# Patient Record
Sex: MALE | Race: OTHER | Employment: STUDENT | URBAN - METROPOLITAN AREA
[De-identification: names, ages, dates, MRNs, and addresses within clinical notes are randomized per-mention and may not be internally consistent; named-entity substitution may affect disease eponyms.]

---
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
## 2023-12-28 ENCOUNTER — HOSPITAL ENCOUNTER (OUTPATIENT)
Dept: GENERAL RADIOLOGY | Facility: HOSPITAL | Age: 18
Discharge: HOME | End: 2023-12-28
Attending: ORTHOPAEDIC SURGERY
Payer: COMMERCIAL

## 2023-12-28 ENCOUNTER — OFFICE VISIT (OUTPATIENT)
Dept: ORTHOPEDICS CLINIC | Facility: CLINIC | Age: 18
End: 2023-12-28

## 2023-12-28 DIAGNOSIS — S63.592A COMPLEX TEAR OF TRIANGULAR FIBROCARTILAGE OF LEFT WRIST, INITIAL ENCOUNTER: Primary | ICD-10-CM

## 2023-12-28 DIAGNOSIS — M25.532 LEFT WRIST PAIN: ICD-10-CM

## 2023-12-28 PROCEDURE — 73110 X-RAY EXAM OF WRIST: CPT | Performed by: ORTHOPAEDIC SURGERY

## 2023-12-28 PROCEDURE — 99244 OFF/OP CNSLTJ NEW/EST MOD 40: CPT | Performed by: ORTHOPAEDIC SURGERY

## 2023-12-28 NOTE — PROGRESS NOTES
NURSING INTAKE COMMENTS:   Chief Complaint   Patient presents with    Wrist Pain     Consult - L wrist - Pt states he injured wrist while boxing about 3 months ago -  some cracking with movent - no numbness or tingling       HPI: This 19year old male presents today with complaints of left wrist pain. He is had pain along the ulnar border of his left wrist intermittently over the last year. He injured his left wrist while boxing. He forcefully palmar flex the wrist at impact and felt immediate pain along the ulnar aspect of the wrist.  Symptoms slightly improved but never completely resolved. About 2 months ago he had another injury when boxing whereby he again palmar flex the wrist at impact. He goes to school at [employer] in [place]. He saw physician in [place] who suggested he had a TFC injury. He was given a splint that he wore short-term. Is not wearing any type of support on the wrist currently. Does feel some swelling along the ulnar wrist.  No numbness or tingling. Does have clicking in the wrist with rotation    History reviewed. No pertinent past medical history. History reviewed. No pertinent surgical history. No current outpatient medications on file.      No Known Allergies  Family History   Problem Relation Age of Onset    Cancer Other         pancreatic, family h/o    Diabetes Other         family h/o    Lipids Other         hyperlipidemia, family h/o    Hypertension Other         family h/o    Obesity Other         family h/o    Allergies Other         family h/o    Breast Cancer Other         family h/o       Social History     Occupational History    Not on file   Tobacco Use    Smoking status: Never    Smokeless tobacco: Never   Substance and Sexual Activity    Alcohol use: Not on file    Drug use: Not on file    Sexual activity: Not on file        Review of Systems:  GENERAL: denies fevers, chills, night sweats, fatigue, unintentional weight loss/gain  SKIN: denies skin lesions, open sores, rash  HEENT:denies recent vision change, new nasal congestion,hearing loss, tinnitus, sore throat, headaches  RESPIRATORY: denies new shortness of breath, cough, asthma, wheezing  CARDIOVASCULAR: denies chest pain, leg cramps with exertion, palpitations, leg swelling  GI: denies abdominal pain, nausea, vomiting, diarrhea, constipation, hematochezia, worsening heartburn or stomach ulcers  : denies dysuria, hematuria, incontinence, increased frequency, urgency, difficulty urinating  MUSCULOSKELETAL: denies musculoskeletal complaints other than in HPI  NEURO: denies numbness, tingling, weakness, balance issues, dizziness, memory loss  PSYCHIATRIC: denies Hx of depression, anxiety, other psychiatric disorders  HEMATOLOGIC: denies blood clots, anemia, blood clotting disorders, blood transfusion  ENDOCRINE: denies autoimmune disease, thyroid issues, or diabetes  ALLERGY: denies asthma, seasonal allergies    Physical Examination:    There were no vitals taken for this visit. Constitutional: appears well hydrated, alert and responsive, no acute distress noted  Extremities: Left wrist tender over the ulnar aspect particular over the ulnar styloid but also over the TFC. Ulnar deviation of the wrist produces mild pain. No significant pain with pronation supination of the forearm. There is a palpable click with pronation supination which seems to emanate from the distal radial ulnar joint. Distal radial ulnar joint shuck testing produces minimal pain and mild instability. Radial aspect of the wrist completely nontender. Snuffbox nontender. Neurological: Left hand light touch and pinprick sensory exam normal. Lateral shoulder light touch and pinprick sensory examination normal.  strength,wrist extension, biceps, triceps, and shoulder abduction strength 5 out of 5. Aleksandr sign negative. No obvious atrophy of the hand. Imaging:   X-rays left wrist are unremarkable.     Labs:  Lab Results   Component Value Date    WBC 8.5 08/26/2018    HGB 16.5 08/26/2018     08/26/2018      Lab Results   Component Value Date    GLU 76 06/07/2019    BUN 17 06/07/2019    CREATSERUM 0.97 06/07/2019    GFRNAA 74 06/07/2019    GFRAA 74 06/07/2019        Assessment and Plan:  Diagnoses and all orders for this visit:    Complex tear of triangular fibrocartilage of left wrist, initial encounter  -     MRI WRIST, LEFT (CPT=73221); Future    Left wrist pain  -     XR WRIST COMPLETE (MIN 3 VIEWS), LEFT (CPT=73110); Future        Assessment: Left wrist ulnar-sided pain, findings consistent with TFC injury    Plan: Given the time course of his symptoms and the lack of improvement over the last year, I advised an MRI of the wrist to evaluate the integrity of the TFC. Follow-up again after the MRI is completed. He is [place] by his mother today. I advised against weightbearing type exercise and martial arts and boxing. Follow Up: Return for follow-up visit after ordered tests completed.     Doris Lombardi MD

## 2023-12-29 ENCOUNTER — LAB ENCOUNTER (OUTPATIENT)
Dept: LAB | Age: 18
End: 2023-12-29
Attending: FAMILY MEDICINE
Payer: COMMERCIAL

## 2023-12-29 ENCOUNTER — OFFICE VISIT (OUTPATIENT)
Dept: FAMILY MEDICINE CLINIC | Facility: CLINIC | Age: 18
End: 2023-12-29

## 2023-12-29 VITALS
HEIGHT: 69.29 IN | BODY MASS INDEX: 30.02 KG/M2 | RESPIRATION RATE: 16 BRPM | TEMPERATURE: 98 F | DIASTOLIC BLOOD PRESSURE: 73 MMHG | WEIGHT: 205 LBS | HEART RATE: 59 BPM | SYSTOLIC BLOOD PRESSURE: 110 MMHG

## 2023-12-29 DIAGNOSIS — Z00.00 ROUTINE PHYSICAL EXAMINATION: Primary | ICD-10-CM

## 2023-12-29 DIAGNOSIS — Z11.3 SCREEN FOR STD (SEXUALLY TRANSMITTED DISEASE): ICD-10-CM

## 2023-12-29 DIAGNOSIS — Z00.00 ROUTINE PHYSICAL EXAMINATION: ICD-10-CM

## 2023-12-29 DIAGNOSIS — R41.89 COGNITIVE CHANGE: ICD-10-CM

## 2023-12-29 LAB
ALBUMIN SERPL-MCNC: 4.5 G/DL (ref 3.2–4.8)
ALBUMIN/GLOB SERPL: 1.6 {RATIO} (ref 1–2)
ALP LIVER SERPL-CCNC: 80 U/L
ALT SERPL-CCNC: 15 U/L
ANION GAP SERPL CALC-SCNC: 3 MMOL/L (ref 0–18)
AST SERPL-CCNC: 21 U/L (ref ?–34)
BILIRUB SERPL-MCNC: 0.5 MG/DL (ref 0.3–1.2)
BUN BLD-MCNC: 13 MG/DL (ref 9–23)
BUN/CREAT SERPL: 13 (ref 10–20)
CALCIUM BLD-MCNC: 9.6 MG/DL (ref 8.7–10.4)
CHLORIDE SERPL-SCNC: 106 MMOL/L (ref 98–112)
CHOLEST SERPL-MCNC: 155 MG/DL (ref ?–200)
CO2 SERPL-SCNC: 29 MMOL/L (ref 21–32)
CREAT BLD-MCNC: 1 MG/DL
DEPRECATED RDW RBC AUTO: 43.5 FL (ref 35.1–46.3)
EGFRCR SERPLBLD CKD-EPI 2021: 110 ML/MIN/1.73M2 (ref 60–?)
ERYTHROCYTE [DISTWIDTH] IN BLOOD BY AUTOMATED COUNT: 12.6 % (ref 11–15)
FASTING PATIENT LIPID ANSWER: YES
FASTING STATUS PATIENT QL REPORTED: YES
GLOBULIN PLAS-MCNC: 2.9 G/DL (ref 2.8–4.4)
GLUCOSE BLD-MCNC: 85 MG/DL (ref 70–99)
HCT VFR BLD AUTO: 52.3 %
HCV AB SERPL QL IA: NONREACTIVE
HDLC SERPL-MCNC: 57 MG/DL (ref 40–59)
HGB BLD-MCNC: 16.7 G/DL
LDLC SERPL CALC-MCNC: 84 MG/DL (ref ?–100)
MCH RBC QN AUTO: 29.6 PG (ref 26–34)
MCHC RBC AUTO-ENTMCNC: 31.9 G/DL (ref 31–37)
MCV RBC AUTO: 92.7 FL
NONHDLC SERPL-MCNC: 98 MG/DL (ref ?–130)
OSMOLALITY SERPL CALC.SUM OF ELEC: 285 MOSM/KG (ref 275–295)
PLATELET # BLD AUTO: 159 10(3)UL (ref 150–450)
POTASSIUM SERPL-SCNC: 4.3 MMOL/L (ref 3.5–5.1)
PROT SERPL-MCNC: 7.4 G/DL (ref 5.7–8.2)
RBC # BLD AUTO: 5.64 X10(6)UL
SODIUM SERPL-SCNC: 138 MMOL/L (ref 136–145)
T PALLIDUM AB SER QL IA: NONREACTIVE
TRIGL SERPL-MCNC: 75 MG/DL (ref 30–149)
TSI SER-ACNC: 3.43 MIU/ML (ref 0.55–4.78)
VLDLC SERPL CALC-MCNC: 12 MG/DL (ref 0–30)
WBC # BLD AUTO: 5.4 X10(3) UL (ref 4–11)

## 2023-12-29 PROCEDURE — 85027 COMPLETE CBC AUTOMATED: CPT

## 2023-12-29 PROCEDURE — 87491 CHLMYD TRACH DNA AMP PROBE: CPT

## 2023-12-29 PROCEDURE — 3074F SYST BP LT 130 MM HG: CPT | Performed by: FAMILY MEDICINE

## 2023-12-29 PROCEDURE — 86803 HEPATITIS C AB TEST: CPT

## 2023-12-29 PROCEDURE — 80053 COMPREHEN METABOLIC PANEL: CPT

## 2023-12-29 PROCEDURE — 99395 PREV VISIT EST AGE 18-39: CPT | Performed by: FAMILY MEDICINE

## 2023-12-29 PROCEDURE — 87389 HIV-1 AG W/HIV-1&-2 AB AG IA: CPT

## 2023-12-29 PROCEDURE — 84443 ASSAY THYROID STIM HORMONE: CPT

## 2023-12-29 PROCEDURE — 86780 TREPONEMA PALLIDUM: CPT

## 2023-12-29 PROCEDURE — 3008F BODY MASS INDEX DOCD: CPT | Performed by: FAMILY MEDICINE

## 2023-12-29 PROCEDURE — 3078F DIAST BP <80 MM HG: CPT | Performed by: FAMILY MEDICINE

## 2023-12-29 PROCEDURE — 36415 COLL VENOUS BLD VENIPUNCTURE: CPT

## 2023-12-29 PROCEDURE — 80061 LIPID PANEL: CPT

## 2023-12-29 PROCEDURE — 87591 N.GONORRHOEAE DNA AMP PROB: CPT
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
## 2024-01-01 LAB
C TRACH DNA SPEC QL NAA+PROBE: NEGATIVE
N GONORRHOEA DNA SPEC QL NAA+PROBE: NEGATIVE

## 2024-03-25 ENCOUNTER — HOSPITAL ENCOUNTER (OUTPATIENT)
Dept: MRI IMAGING | Facility: HOSPITAL | Age: 19
Discharge: HOME | End: 2024-03-25
Attending: ORTHOPAEDIC SURGERY
Payer: COMMERCIAL

## 2024-03-25 DIAGNOSIS — S63.592A COMPLEX TEAR OF TRIANGULAR FIBROCARTILAGE OF LEFT WRIST, INITIAL ENCOUNTER: ICD-10-CM

## 2024-03-25 PROCEDURE — 73221 MRI JOINT UPR EXTREM W/O DYE: CPT | Performed by: ORTHOPAEDIC SURGERY

## 2024-03-28 ENCOUNTER — OFFICE VISIT (OUTPATIENT)
Dept: ORTHOPEDICS CLINIC | Facility: CLINIC | Age: 19
End: 2024-03-28
Payer: COMMERCIAL

## 2024-03-28 DIAGNOSIS — S66.912D STRAIN OF LEFT WRIST, SUBSEQUENT ENCOUNTER: Primary | ICD-10-CM

## 2024-03-28 PROCEDURE — 99213 OFFICE O/P EST LOW 20 MIN: CPT | Performed by: ORTHOPAEDIC SURGERY

## 2024-03-28 NOTE — PROGRESS NOTES
NURSING INTAKE COMMENTS:   Chief Complaint   Patient presents with    Wrist Pain     L wrist f/u- Pt here for MRI results. Pt states no pain due to discontinuing  activity.        HPI: This 18 year old male presents today with complaints of left wrist pain follow-up.  He had the MRI and is here for the results.  Was last here a few months ago.  He has noted improvement in his symptoms.  He has been avoiding activities including sports and weightlifting.    History reviewed. No pertinent past medical history.  History reviewed. No pertinent surgical history.  No current outpatient medications on file.     No Known Allergies  Family History   Problem Relation Age of Onset    Cancer Other         pancreatic, family h/o    Diabetes Other         family h/o    Lipids Other         hyperlipidemia, family h/o    Hypertension Other         family h/o    Obesity Other         family h/o    Allergies Other         family h/o    Breast Cancer Other         family h/o       Social History     Occupational History    Not on file   Tobacco Use    Smoking status: Never     Passive exposure: Never    Smokeless tobacco: Never   Vaping Use    Vaping Use: Never used   Substance and Sexual Activity    Alcohol use: Not on file    Drug use: Yes     Types: Cannabis    Sexual activity: Not on file        Review of Systems:  GENERAL: denies fevers, chills, night sweats, fatigue, unintentional weight loss/gain  SKIN: denies skin lesions, open sores, rash  HEENT:denies recent vision change, new nasal congestion,hearing loss, tinnitus, sore throat, headaches  RESPIRATORY: denies new shortness of breath, cough, asthma, wheezing  CARDIOVASCULAR: denies chest pain, leg cramps with exertion, palpitations, leg swelling  GI: denies abdominal pain, nausea, vomiting, diarrhea, constipation, hematochezia, worsening heartburn or stomach ulcers  : denies dysuria, hematuria, incontinence, increased frequency, urgency, difficulty  urinating  MUSCULOSKELETAL: denies musculoskeletal complaints other than in HPI  NEURO: denies numbness, tingling, weakness, balance issues, dizziness, memory loss  PSYCHIATRIC: denies Hx of depression, anxiety, other psychiatric disorders  HEMATOLOGIC: denies blood clots, anemia, blood clotting disorders, blood transfusion  ENDOCRINE: denies autoimmune disease, thyroid issues, or diabetes  ALLERGY: denies asthma, seasonal allergies    Physical Examination:    There were no vitals taken for this visit.  Constitutional: appears well hydrated, alert and responsive, no acute distress noted  Extremities: Left wrist minimally tender over the dorsal aspect of the distal ulna.  Ulnar styloid nontender.  Pisa triquetral articulation nontender.  No pain with dorsiflexion palmar flexion of the wrist.  No significant pain with ulnar deviation of the wrist  Neurological: Left hand light touch and pinprick sensory exam normal. Lateral shoulder light touch and pinprick sensory examination normal.  strength,wrist extension, biceps, triceps, and shoulder abduction strength 5 out of 5. Aleksandr sign negative. No obvious atrophy of the hand.        Imaging:   MRI WRIST, LEFT (ZUK=89277)    Result Date: 3/25/2024  PROCEDURE: MRI WRIST, LEFT (CPT=73221)  COMPARISON: None.  INDICATIONS: Left wrist pain.  S63.592A Complex tear of triangular fibrocartilage of left wrist, initial encounter  TECHNIQUE: A complete multi-planar MRI of the wrist was performed.  FINDINGS:  Evaluation is slightly degraded by patient-related motion artifact.    TFCC: Intact triangle fibrocartilage.  ULNAR VARIANCE: Neutral.  LIGAMENTS: Intact scapholunate and lunotriquetral ligaments. EXTENSOR TENDONS: Mild ECU tendinosis.  Otherwise normal. FLEXOR TENDONS: Intact.  CARPAL TUNNEL: Normal.  GUYON'S CANAL: Normal.  BONES/JOINTS: Pisiform triquetral joint effusion.  Trace/physiologic fluid in the distal radial ulnar joint.  No suspect bone lesion, bone marrow  edema, or occult fracture. OTHER: Negative.          CONCLUSION:  1. Intact triangular fibrocartilage. 2. Mild extensor carpi ulnaris tendinosis. 3. Pisiform triquetral joint effusion.  Otherwise negative.    Dictated by (CST): Jhon Ortiz MD on 3/25/2024 at 3:04 PM     Finalized by (CST): Jhon Ortiz MD on 3/25/2024 at 3:14 PM             Labs:  Lab Results   Component Value Date    WBC 5.4 12/29/2023    HGB 16.7 12/29/2023    .0 12/29/2023      Lab Results   Component Value Date    GLU 85 12/29/2023    BUN 13 12/29/2023    CREATSERUM 1.00 12/29/2023    GFRNAA 74 06/07/2019    GFRAA 74 06/07/2019        Assessment and Plan:  Diagnoses and all orders for this visit:    Strain of left wrist, subsequent encounter        Assessment: Left wrist ECU tendinitis, likely ulnar bone contusion, healed    Plan: I recommend no further treatment.  I advised against high-impact activities as far as the wrist is concerned.  Avoid heavy weightbearing type exercise.  Gradually return to weightbearing exercise as tolerated over the next 4 to 6 weeks.  Advised icing and oral anti-inflammatory use as medically tolerated.  Suggested use of a wrap while lifting.  Follow-up with me again as needed.  Mother was present with visit today.    Follow Up: Return if symptoms worsen or fail to improve.    JUAN LAU MD

## 2025-03-21 ENCOUNTER — LAB ENCOUNTER (OUTPATIENT)
Dept: LAB | Facility: HOSPITAL | Age: 20
End: 2025-03-21
Attending: STUDENT IN AN ORGANIZED HEALTH CARE EDUCATION/TRAINING PROGRAM
Payer: COMMERCIAL

## 2025-03-21 ENCOUNTER — OFFICE VISIT (OUTPATIENT)
Dept: INTERNAL MEDICINE CLINIC | Facility: CLINIC | Age: 20
End: 2025-03-21

## 2025-03-21 ENCOUNTER — HOSPITAL ENCOUNTER (OUTPATIENT)
Dept: CT IMAGING | Facility: HOSPITAL | Age: 20
Discharge: HOME | End: 2025-03-21
Attending: STUDENT IN AN ORGANIZED HEALTH CARE EDUCATION/TRAINING PROGRAM
Payer: COMMERCIAL

## 2025-03-21 VITALS
WEIGHT: 171 LBS | DIASTOLIC BLOOD PRESSURE: 77 MMHG | SYSTOLIC BLOOD PRESSURE: 121 MMHG | HEART RATE: 65 BPM | BODY MASS INDEX: 24.48 KG/M2 | HEIGHT: 70 IN

## 2025-03-21 DIAGNOSIS — R63.4 UNINTENTIONAL WEIGHT LOSS: ICD-10-CM

## 2025-03-21 DIAGNOSIS — Z00.00 ANNUAL PHYSICAL EXAM: ICD-10-CM

## 2025-03-21 DIAGNOSIS — E55.9 VITAMIN D DEFICIENCY: ICD-10-CM

## 2025-03-21 DIAGNOSIS — R10.13 EPIGASTRIC PAIN: Primary | ICD-10-CM

## 2025-03-21 DIAGNOSIS — R10.13 EPIGASTRIC PAIN: ICD-10-CM

## 2025-03-21 DIAGNOSIS — Z82.49 FAMILY HISTORY OF EARLY CAD: ICD-10-CM

## 2025-03-21 DIAGNOSIS — Z11.3 SCREEN FOR STD (SEXUALLY TRANSMITTED DISEASE): ICD-10-CM

## 2025-03-21 DIAGNOSIS — Z80.0 FAMILY HISTORY OF GASTRIC CANCER: Chronic | ICD-10-CM

## 2025-03-21 DIAGNOSIS — Z80.0 FAMILY HISTORY OF PANCREATIC CANCER: Chronic | ICD-10-CM

## 2025-03-21 LAB
ALBUMIN SERPL-MCNC: 4.8 G/DL (ref 3.2–4.8)
ALBUMIN/GLOB SERPL: 2 {RATIO} (ref 1–2)
ALP LIVER SERPL-CCNC: 79 U/L
ALT SERPL-CCNC: 10 U/L
ANION GAP SERPL CALC-SCNC: 6 MMOL/L (ref 0–18)
AST SERPL-CCNC: 17 U/L (ref ?–34)
BASOPHILS # BLD AUTO: 0.03 X10(3) UL (ref 0–0.2)
BASOPHILS NFR BLD AUTO: 0.6 %
BILIRUB SERPL-MCNC: 1.1 MG/DL (ref 0.3–1.2)
BUN BLD-MCNC: 17 MG/DL (ref 9–23)
BUN/CREAT SERPL: 17.3 (ref 10–20)
CALCIUM BLD-MCNC: 9.5 MG/DL (ref 8.7–10.4)
CHLORIDE SERPL-SCNC: 107 MMOL/L (ref 98–112)
CHOLEST SERPL-MCNC: 137 MG/DL (ref ?–200)
CO2 SERPL-SCNC: 27 MMOL/L (ref 21–32)
CREAT BLD-MCNC: 0.9 MG/DL
CREAT BLD-MCNC: 0.98 MG/DL
DEPRECATED RDW RBC AUTO: 42.5 FL (ref 35.1–46.3)
EGFRCR SERPLBLD CKD-EPI 2021: 113 ML/MIN/1.73M2 (ref 60–?)
EGFRCR SERPLBLD CKD-EPI 2021: 125 ML/MIN/1.73M2 (ref 60–?)
EOSINOPHIL # BLD AUTO: 0.07 X10(3) UL (ref 0–0.7)
EOSINOPHIL NFR BLD AUTO: 1.3 %
ERYTHROCYTE [DISTWIDTH] IN BLOOD BY AUTOMATED COUNT: 12.8 % (ref 11–15)
EST. AVERAGE GLUCOSE BLD GHB EST-MCNC: 97 MG/DL (ref 68–126)
FASTING PATIENT LIPID ANSWER: YES
FASTING STATUS PATIENT QL REPORTED: YES
GLOBULIN PLAS-MCNC: 2.4 G/DL (ref 2–3.5)
GLUCOSE BLD-MCNC: 86 MG/DL (ref 70–99)
HAV AB SER QL IA: REACTIVE
HAV IGM SER QL: NONREACTIVE
HBA1C MFR BLD: 5 % (ref ?–5.7)
HBV CORE AB SERPL QL IA: NONREACTIVE
HBV SURFACE AB SER QL: NONREACTIVE
HBV SURFACE AB SERPL IA-ACNC: <3.1 MIU/ML
HBV SURFACE AG SERPL QL IA: NONREACTIVE
HCT VFR BLD AUTO: 48.1 %
HCV AB SERPL QL IA: NONREACTIVE
HDLC SERPL-MCNC: 57 MG/DL (ref 40–59)
HGB BLD-MCNC: 16.1 G/DL
IMM GRANULOCYTES # BLD AUTO: 0.02 X10(3) UL (ref 0–1)
IMM GRANULOCYTES NFR BLD: 0.4 %
LDLC SERPL CALC-MCNC: 70 MG/DL (ref ?–100)
LYMPHOCYTES # BLD AUTO: 1.59 X10(3) UL (ref 1–4)
LYMPHOCYTES NFR BLD AUTO: 30.6 %
MCH RBC QN AUTO: 30.1 PG (ref 26–34)
MCHC RBC AUTO-ENTMCNC: 33.5 G/DL (ref 31–37)
MCV RBC AUTO: 90.1 FL
MONOCYTES # BLD AUTO: 0.35 X10(3) UL (ref 0.1–1)
MONOCYTES NFR BLD AUTO: 6.7 %
NEUTROPHILS # BLD AUTO: 3.13 X10 (3) UL (ref 1.5–7.7)
NEUTROPHILS # BLD AUTO: 3.13 X10(3) UL (ref 1.5–7.7)
NEUTROPHILS NFR BLD AUTO: 60.4 %
NONHDLC SERPL-MCNC: 80 MG/DL (ref ?–130)
OSMOLALITY SERPL CALC.SUM OF ELEC: 291 MOSM/KG (ref 275–295)
PLATELET # BLD AUTO: 188 10(3)UL (ref 150–450)
POTASSIUM SERPL-SCNC: 4.3 MMOL/L (ref 3.5–5.1)
PROT SERPL-MCNC: 7.2 G/DL (ref 5.7–8.2)
RBC # BLD AUTO: 5.34 X10(6)UL
SODIUM SERPL-SCNC: 140 MMOL/L (ref 136–145)
T PALLIDUM AB SER QL IA: NONREACTIVE
TRIGL SERPL-MCNC: 39 MG/DL (ref 30–149)
TSI SER-ACNC: 1.34 UIU/ML (ref 0.55–4.78)
VIT D+METAB SERPL-MCNC: 21.5 NG/ML (ref 30–100)
VLDLC SERPL CALC-MCNC: 6 MG/DL (ref 0–30)
WBC # BLD AUTO: 5.2 X10(3) UL (ref 4–11)

## 2025-03-21 PROCEDURE — 86709 HEPATITIS A IGM ANTIBODY: CPT

## 2025-03-21 PROCEDURE — 36415 COLL VENOUS BLD VENIPUNCTURE: CPT

## 2025-03-21 PROCEDURE — 86704 HEP B CORE ANTIBODY TOTAL: CPT

## 2025-03-21 PROCEDURE — 86708 HEPATITIS A ANTIBODY: CPT

## 2025-03-21 PROCEDURE — 87801 DETECT AGNT MULT DNA AMPLI: CPT

## 2025-03-21 PROCEDURE — 74177 CT ABD & PELVIS W/CONTRAST: CPT | Performed by: STUDENT IN AN ORGANIZED HEALTH CARE EDUCATION/TRAINING PROGRAM

## 2025-03-21 PROCEDURE — 87340 HEPATITIS B SURFACE AG IA: CPT

## 2025-03-21 PROCEDURE — 87591 N.GONORRHOEAE DNA AMP PROB: CPT

## 2025-03-21 PROCEDURE — 83036 HEMOGLOBIN GLYCOSYLATED A1C: CPT

## 2025-03-21 PROCEDURE — 80061 LIPID PANEL: CPT

## 2025-03-21 PROCEDURE — 84443 ASSAY THYROID STIM HORMONE: CPT

## 2025-03-21 PROCEDURE — 80503 PATH CLIN CONSLTJ SF 5-20: CPT

## 2025-03-21 PROCEDURE — 85025 COMPLETE CBC W/AUTO DIFF WBC: CPT

## 2025-03-21 PROCEDURE — 86706 HEP B SURFACE ANTIBODY: CPT

## 2025-03-21 PROCEDURE — 82306 VITAMIN D 25 HYDROXY: CPT

## 2025-03-21 PROCEDURE — 86780 TREPONEMA PALLIDUM: CPT

## 2025-03-21 PROCEDURE — 87389 HIV-1 AG W/HIV-1&-2 AB AG IA: CPT

## 2025-03-21 PROCEDURE — 80053 COMPREHEN METABOLIC PANEL: CPT

## 2025-03-21 PROCEDURE — 86803 HEPATITIS C AB TEST: CPT

## 2025-03-21 PROCEDURE — 87491 CHLMYD TRACH DNA AMP PROBE: CPT

## 2025-03-21 PROCEDURE — 82565 ASSAY OF CREATININE: CPT

## 2025-03-21 PROCEDURE — 99385 PREV VISIT NEW AGE 18-39: CPT | Performed by: STUDENT IN AN ORGANIZED HEALTH CARE EDUCATION/TRAINING PROGRAM

## 2025-03-21 RX ORDER — OMEPRAZOLE 40 MG/1
40 CAPSULE, DELAYED RELEASE ORAL DAILY
Qty: 90 CAPSULE | Refills: 3 | Status: SHIPPED | OUTPATIENT
Start: 2025-03-21 | End: 2026-03-16

## 2025-03-21 NOTE — PROGRESS NOTES
Please relay to patient if not read:     Hello There!     Dr. Rose here, I have reviewed your labs here are your recommendations:    # Lipids/cholesterol: Your lipids are well controlled at this time, slight abnormalities are ok and are diet related. Heart disease risk scoring, ie ASCVD, typically starts around age 40 and increases with age. I will address this with you if the ASCVD reaches greater than 5% to discuss preventive options. Please continue with exercise and healthy diet, such as the mediterranean diet.     The ASCVD Risk score (Wilner BERGER, et al., 2019) failed to calculate for the following reasons:    The 2019 ASCVD risk score is only valid for ages 40 to 79    # Diabetes/A1C: Your A1C Last A1c value was 5% done 3/21/2025. which shows  no diabetes. We will recheck this value yearly, sooner if clinically indicated.     # TSH: Your thyroid (TSH) function is normal.     # CMP: Your comprehensive metabolic panel shows overall stable functioning kidneys (creatinine, GFR), liver (AST, ALT, Bilirubin), and electrolytes (sodium, potassium, calcium). Slight variations in other values such as BUN/Creat, Serum Osm, anion gap, chloride, etc are not of clinical value at this time.     # CBC: Your complete blood count shows overall stable red blood cells, white blood cells, platelets (help you stop bleeding), and hematocrit (thickness of blood),  Slight variations in other values such as RDW/sw, MCH are not of clinical value at this time.       # Vitamins: Your vitamin D level is Vitamin D Insufficiency (<30ng/mL) please start 2,000 International Units daily (it is cheaper to purchase from [facility] or your local pharmacy rather than sending a prescription in). will recheck with next annual physical or sooner if clinically indicated      #STD Screening:  Your Screening for Chlamydia and Gonorrhea is (still pending)  your Screening for HIV is also Negative/No Virus detected,   Your screening for Syphillis is  negative/No infection detected.  #Hepatitis Screening:   #Hepatitis Screening: Your hepatitis panel shows that you do not have active immunity to Hepatitis B which is a preventable disease. I would recommend to get the adult 3 dose booster series. Would recommend to get 1st dose now, then second dose is after 1 month and third dose is 6 months after first dose. Please schedule a nurse visit to have vaccine administered or we can do at your next upcoming follow up.      If there are still any other pending labs/results that are still pending. I will send a follow up [employer] message once those result.     If you have any questions or concerns in regards to these labs please schedule a follow up and will gladly discuss.   -Dr. Rose

## 2025-03-21 NOTE — PROGRESS NOTES
OFFICE NOTE       The following individual(s) verbally consented to be recorded using ambient AI listening technology and understand that they can each withdraw their consent to this listening technology at any point by asking the clinician to turn off or pause the recording:    Patient name: Darci Gallo Jr.  Additional names:            Patient ID: Darci Gallo Jr. is a 19 year old male.  Today's Date: 03/21/25  Chief Complaint: Physical      History of Present Illness  Darci Gallo Jr. is a 19 year old male who presents for an annual physical examination and evaluation of gastrointestinal symptoms.    He has been experiencing gastrointestinal symptoms, including nausea and vomiting, intermittently over the past year. A recent exacerbation occurred last week, with approximately ten episodes of vomiting in one day, primarily expelling stomach acid. He notes a lack of appetite and significant nausea during these episodes. Symptoms were more consistent during the summer but subsided until the recent flare-up. Eating before bed sometimes exacerbates symptoms, leading to nocturnal nausea and vomiting. Frequent belching, particularly in the mornings, is a new development. Occasional midsection and side pain is present, but there are no significant changes in bowel movements. Previous stool studies in [place] were normal, and he tested negative for H. pylori through a stool test. He was previously prescribed pantoprazole 20 mg to be taken an hour before meals but has not been consistent with this medication.    He has lost approximately 35 pounds over the past two years, which he attributes to changes in diet and exercise, although he acknowledges the weight loss is unusual.    There is a family history of gastrointestinal issues, including a cousin who passed away from stomach cancer at the age of 25, a paternal grandfather who had pancreatic cancer, and a great-grandfather who underwent stomach  surgeries.    His symptoms do not seem to correlate with stress from academic pressures such as finals or midterms, although he acknowledges anxiety might play a role.    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 He reports using marijuana to aid sleep, noting a potential dependency that may affect his sleep quality. He has reduced his marijuana use recently.     PH9-:10   GLENN-7: 4    Vitals:    03/21/25 0811   BP: 121/77   Pulse: 65   Weight: 171 lb (77.6 kg)   Height: 5' 10\" (1.778 m)     body mass index is 24.54 kg/m².  BP Readings from Last 3 Encounters:   03/21/25 121/77   12/29/23 110/73   10/17/22 118/76     The ASCVD Risk score (Wilner BERGER, et al., 2019) failed to calculate for the following reasons:    The 2019 ASCVD risk score is only valid for ages 40 to 79  Results  LABS  Stool studies: Normal  H. pylori stool antigen: Negative (01/2025)       Medications reviewed:  Current Outpatient Medications   Medication Sig Dispense Refill    Omeprazole 40 MG Oral Capsule Delayed Release Take 1 capsule (40 mg total) by mouth daily. 90 capsule 3         Assessment & Plan    1. Epigastric pain (Primary)  -     CT ABDOMEN+PELVIS(CONTRAST ONLY)(CPT=74177); Future; Expected date: 03/21/2025  -     Gastro Referral - In Network  -     Helicobacter Pylori Breath Test; Future; Expected date: 03/21/2025  -     Omeprazole; Take 1 capsule (40 mg total) by mouth daily.  Dispense: 90 capsule; Refill: 3  2. Annual physical exam  -     EKG 12 Lead; Future; Expected date: 03/21/2025  -     Lipid Panel; Future; Expected date: 03/21/2025  -     TSH W Reflex To Free T4; Future; Expected date: 03/21/2025  -     Hemoglobin A1C; Future; Expected date: 03/21/2025  -     Comp Metabolic Panel (14); Future; Expected date: 03/21/2025  -     CBC With Differential With Platelet; Future; Expected date: 03/21/2025  -     Vitamin D; Future; Expected date: 03/21/2025  -     HIV Ag/Ab Combo; Future; Expected  date: 03/21/2025  -     T Pallidum Screening Cascade; Future; Expected date: 03/21/2025  -     Chlamydia/Gc Amplification; Future; Expected date: 03/21/2025  -     Hepatitis A B + C profile; Future; Expected date: 03/21/2025  3. Family history of gastric cancer  -     CT ABDOMEN+PELVIS(CONTRAST ONLY)(CPT=74177); Future; Expected date: 03/21/2025  -     Gastro Referral - In Network  -     Omeprazole; Take 1 capsule (40 mg total) by mouth daily.  Dispense: 90 capsule; Refill: 3  4. Family history of pancreatic cancer  -     Omeprazole; Take 1 capsule (40 mg total) by mouth daily.  Dispense: 90 capsule; Refill: 3  5. Family history of early CAD  -     EKG 12 Lead; Future; Expected date: 03/21/2025  6. Vitamin D deficiency  -     Vitamin D; Future; Expected date: 03/21/2025  7. Screen for STD (sexually transmitted disease)  -     HIV Ag/Ab Combo; Future; Expected date: 03/21/2025  -     T Pallidum Screening Cascade; Future; Expected date: 03/21/2025  -     Chlamydia/Gc Amplification; Future; Expected date: 03/21/2025  -     Hepatitis A B + C profile; Future; Expected date: 03/21/2025  8. Unintentional weight loss  -     CT ABDOMEN+PELVIS(CONTRAST ONLY)(CPT=74177); Future; Expected date: 03/21/2025    Assessment & Plan  Gastrointestinal Symptoms  Nausea, vomiting, and epigastric pain with family history of gastrointestinal cancers. Differential includes peptic ulcer disease, gastritis, or gastric cancer. Negative H. pylori stool test.  - Order CT abdomen and pelvis stat.  - Perform H. pylori breath test.  - Refer to gastroenterology.  - Prescribe omeprazole 40 mg daily for 90 days.  - Consider endoscopy if symptoms persist or worsen.    Unintentional Weight Loss  Weight loss of 35 pounds over two years, concerning with gastrointestinal symptoms and family history.  - Include in CT scan indication.    Anxiety and Depression  Mild anxiety and depression, prefers non-pharmacological interventions.  - Encourage use of campus  counseling services.  - Discuss potential for future pharmacological intervention if symptoms worsen.    Cannabis Use  Cannabis use for sleep, potential dependency, may exacerbate gastrointestinal symptoms.  - Advise moderation in cannabis use.    General Health Maintenance  College student with seasonal allergies, requires routine health screenings.  - Order baseline EKG.  - Order STD panel including hepatitis, HIV, gonorrhea, chlamydia, syphilis.  - Order lipid panel, TSH, A1c, complete metabolic panel, complete blood count.  - Order vitamin D level.    Follow-up  In town temporarily, follow-up necessary for test results and symptom management.  - Schedule follow-up after finals.  - Monitor test results and communicate via [employer].  - Ensure he contacts GI for appointment scheduling.       Follow Up: As needed/if symptoms worsen or No follow-ups on file..         Objective/ Results:   Physical Exam  Constitutional:       Appearance: He is well-developed.   Cardiovascular:      Rate and Rhythm: Normal rate and regular rhythm.      Heart sounds: Normal heart sounds.   Pulmonary:      Effort: Pulmonary effort is normal.      Breath sounds: Normal breath sounds.   Abdominal:      General: Bowel sounds are normal.      Palpations: Abdomen is soft.   Skin:     General: Skin is warm and dry.   Neurological:      Mental Status: He is alert and oriented to person, place, and time.      Deep Tendon Reflexes: Reflexes are normal and symmetric.        Physical Exam       Reviewed:    Patient Active Problem List    Diagnosis    Unintentional weight loss    Allergic rhinitis      Allergies[1]     Social History     Socioeconomic History    Marital status: Single   Tobacco Use    Smoking status: Never     Passive exposure: Never    Smokeless tobacco: Never   Vaping Use    Vaping status: Some Days    Substances: THC, Flavoring    Devices: Disposable   Substance and Sexual Activity    Alcohol use: Yes     Comment: very rare     Drug use: Yes     Types: Cannabis   Other Topics Concern    Pt has a pacemaker No    Pt has a defibrillator No    Reaction to local anesthetic No     Social Drivers of Health     Food Insecurity: Unknown (3/21/2025)    NCSS - Food Insecurity     Worried About Running Out of Food in the Last Year: No   Transportation Needs: No Transportation Needs (3/21/2025)    NCSS - Transportation     Lack of Transportation: No   Housing Stability: Not At Risk (3/21/2025)    NCSS - Housing/Utilities     Has Housing: Yes     Worried About Losing Housing: No     Unable to Get Utilities: No      Review of Systems   Constitutional: Negative.    Respiratory: Negative.     Cardiovascular: Negative.    Gastrointestinal:  Positive for abdominal pain.   Skin: Negative.    Neurological: Negative.        All other systems negative unless otherwise stated in ROS or HPI above.       Jaime Rose MD  Internal Medicine       Call office with any questions or seek emergency care if necessary.   Patient understands and agrees to follow directions and advice.      ----------------------------------------- PATIENT INSTRUCTIONS-----------------------------------------     There are no Patient Instructions on file for this visit.        The 21st Century Cures Act makes medical notes available to patients in the interest of transparency.  However, please be advised that this is a medical document.  It is intended as a peer to peer communication.  It is written in medical language and may contain abbreviations or verbiage that are technical and unfamiliar.  It may appear blunt or direct.  Medical documents are intended to carry relevant information, facts as evident, and the clinical opinion of the practitioner.          [1]   Allergies  Allergen Reactions    Latex ITCHING     Itching rash

## 2025-03-21 NOTE — PROGRESS NOTES
Please relay to pt     Garo Bejarano,   Dr. Rose here   Your CT scan of the abdomen and pelvis is normal. Please proceed with H Pylori testing and starting omeprazole 40mg daily if negative, and schedule follow up with Dr. Suarez once you return for summer break.  -Dr. Roes

## 2025-03-22 ENCOUNTER — LAB ENCOUNTER (OUTPATIENT)
Dept: LAB | Age: 20
End: 2025-03-22
Attending: STUDENT IN AN ORGANIZED HEALTH CARE EDUCATION/TRAINING PROGRAM
Payer: COMMERCIAL

## 2025-03-22 DIAGNOSIS — Z00.00 ANNUAL PHYSICAL EXAM: ICD-10-CM

## 2025-03-22 DIAGNOSIS — R10.13 EPIGASTRIC PAIN: ICD-10-CM

## 2025-03-22 DIAGNOSIS — Z82.49 FAMILY HISTORY OF EARLY CAD: ICD-10-CM

## 2025-03-22 LAB
ATRIAL RATE: 48 BPM
P AXIS: 45 DEGREES
P-R INTERVAL: 142 MS
Q-T INTERVAL: 410 MS
QRS DURATION: 84 MS
QTC CALCULATION (BEZET): 366 MS
R AXIS: 63 DEGREES
T AXIS: 38 DEGREES
VENTRICULAR RATE: 48 BPM

## 2025-03-22 PROCEDURE — 93010 ELECTROCARDIOGRAM REPORT: CPT | Performed by: INTERNAL MEDICINE

## 2025-03-22 PROCEDURE — 93005 ELECTROCARDIOGRAM TRACING: CPT

## 2025-03-22 PROCEDURE — 83013 H PYLORI (C-13) BREATH: CPT

## 2025-03-23 NOTE — PROGRESS NOTES
Please relay to patient if not read:     Dr. Rose Lima here  I reviewed your EKG:  shows sinus bradycardia, which is normal in relatively young/healthy patients.   Ok for ADHD meds, however if you have Chest pain or palpitations please stop. Otherwise will see you in 3 months for refills in clinic if not seen by behavior health by then.   -Dr. Rose

## 2025-03-24 LAB
C TRACH DNA SPEC QL NAA+PROBE: NEGATIVE
N GONORRHOEA DNA SPEC QL NAA+PROBE: NEGATIVE

## 2025-03-26 LAB — H PYLORI BREATH TEST: NEGATIVE

## 2025-03-26 NOTE — PROGRESS NOTES
Please relay to patient if not read:     Garo Bejarano,   Your H pylori test is negative. Please start taking daily PPI 40mg daily in the morning and if no improvement follow up in clinic and with gastroenterology if no improvement.  -Dr. Rose

## 2025-03-27 ENCOUNTER — TELEPHONE (OUTPATIENT)
Dept: CASE MANAGEMENT | Age: 20
End: 2025-03-27

## 2025-03-27 NOTE — TELEPHONE ENCOUNTER
Spoke with Kj Doty to peer scheduled for April 4th,2025 Friday at 1:30pm with   Dr ni Gallegos. Will call Dr. Rose's cell  May call 15mins earlier or later than appt time  May not show up as company name may show up as spam so please be aware

## 2025-03-27 NOTE — TELEPHONE ENCOUNTER
Hello,    Prior authorization for CT ABDOMEN+PELVIS(CONTRAST ONLY)(CPT=74177)  has been denied by [employer].    Benefit manager notes: Not Medically Necessary.     Unable to view denial letter on web portal. Please call [employer] to discuss peer to peer/appeal options. [phone]   Case reference # [id number]     Patient completed study 3/21/2025 due to STAT order. Please advise.    Thank you,    Zoraida CANADA  Referral Specialist  [facility]

## 2025-04-04 ENCOUNTER — TELEPHONE (OUTPATIENT)
Facility: LOCATION | Age: 20
End: 2025-04-04

## 2025-05-02 ENCOUNTER — TELEPHONE (OUTPATIENT)
Dept: INTERNAL MEDICINE CLINIC | Facility: CLINIC | Age: 20
End: 2025-05-02

## (undated) NOTE — Clinical Note
VACCINE ADMINISTRATION RECORD  PARENT / GUARDIAN APPROVAL  Date: 3/3/2017  Vaccine administered to: My Choudhary     : 2005    MRN: [id number]    A copy of the appropriate Centers for Disease Control and Prevention Vaccine Information statemen

## (undated) NOTE — LETTER
June 12, 2019    Christos Dickens [id number]      Dear Jorge Chang: The following are the results of your recent tests. Please review the list of test results.   Your result is the value on the left; we have also supplied t

## (undated) NOTE — LETTER
Name:  Franky Singh Year:  10th Grade Class: Student ID No.:   Address:  [address] [place] [address],[facility] Floor [address] [place] [address] Phone:  [phone] (home)  :  11year old   Name Relationship [address] Work Phone Home Phone Mobile Phone   1.  [place] 15. Does anyone in your family have hypertrophic cardiomyopathy, Marfan syndrome, arrhythmogenic right ventricular cardiomyopathy, long QT syndrome, short QT syndrome, Brugada syndrome, or catecholaminergic polymorphic ventricular tachycardia? No   15.  Kraft 29. Have you ever had a head injury or concussion? No   35. Have you ever had a hit or blow to the head that caused confusion, prolonged headache, or memory problems? No   36. Do you have a history of seizure disorder? No   37.  Do you have headaches with e 5' 9.25\" (1.759 m)   Wt 173 lb (78.5 kg)   BMI 25.36 kg/m²  91 %ile (Z= 1.34) based on CDC (Boys, 2-20 Years) BMI-for-age based on BMI available as of 6/7/2019.  male    Vision: R 20/20          L 20/20          BOTH 20/20          Corrected   MEDICAL NORM [de-identified] Assistants or Advanced Nurse Practitioners to sign off on physicals.    [facility] Substance Testing Policy Consent to Random Testing   (This section for high school students only)   6638-0057 school term    As a prerequisite to participation in Michaela

## (undated) NOTE — LETTER
[facility] [employer] of Child Health Examination       Student's Name  Roni Rodriguez D verifying above immunization history must sign below.   Signature                                                                                                                                   Title                           Date     Signature Pillo Aponte Birth Date  11/26/2005  Sex  Male School   Grade Level/ID#  9th Grade   HEALTH HISTORY          TO BE COMPLETED AND SIGNED BY PARENT/GUARDIAN AND VERIFIED BY HEALTH CARE PROVIDER    ALLERGIES  (Food, drug, insect, other)  Patient has no kn PHYSICAL EXAMINATION REQUIREMENTS    Entire section below to be completed by MD//APN/PA       PHYSICAL EXAMINATION REQUIREMENTS (head circumference if <33 years old):   /62   Pulse (!) 49   Temp 98.4 °F (36.9 °C) (Oral)   Resp 17   Ht 5' 8.39\" (1 Nose Yes  Neurological Yes    Throat Yes  Musculoskeletal Yes    Mouth/Dental Yes  Spinal examination Yes    Cardiovascular/HTN Yes  Nutritional status Yes    Respiratory Yes                   Diagnosis of Asthma: No Mental Health Yes        Currently Pres

## (undated) NOTE — LETTER
10/18/2017          To Whom It May Concern:    Cassie Hicks is currently under my medical care and should be off PE due to injury until 10/25/17    If you require additional information please contact our office. Sincerely,    Edgardo Pruitt.  Juan Jose Fortune MD

## (undated) NOTE — LETTER
Name:  Valeri Vivar Year:  8th Grade Class: Student ID No.:   Address:  [address] [place] [address],[facility] Floor [address] [place] [address] Phone:  [phone] (home)  :  13year old   Name Relationship [address] Work Phone Home Phone Mobile Phone   1.  MEHRDAD 15. Does anyone in your family have hypertrophic cardiomyopathy, Marfan syndrome, arrhythmogenic right ventricular cardiomyopathy, long QT syndrome, short QT syndrome, Brugada syndrome, or catecholaminergic polymorphic ventricular tachycardia? No   15.  Kraft 29. Have you ever had a head injury or concussion? No   35. Have you ever had a hit or blow to the head that caused confusion, prolonged headache, or memory problems? No   36. Do you have a history of seizure disorder? No   37.  Do you have headaches with e adult)   Pulse 66   Temp (!) 97.4 °F (36.3 °C) (Oral)   Ht 5' 8.27\" (1.734 m)   Wt 173 lb 9.6 oz (78.7 kg)   BMI 26.19 kg/m²  96 %ile (Z= 1.71) based on CDC 2-20 Years BMI-for-age data using vitals from 8/4/2017.  male    Vision: R            L [de-identified] Assistants or Advanced Nurse Practitioners to sign off on physicals.    [facility] Substance Testing Policy Consent to Random Testing   (This section for high school students only)   6917-6215 school term    As a prerequisite to participation in [employer]a

## (undated) NOTE — ED AVS SNAPSHOT
Nimo Bland   MRN: [id number]    Department:  [facility] Emergency Department   Date of Visit:  8/24/2018           Disclosure     Insurance plans vary and the physician(s) referred by the ER may not be covered by your plan.  Please contact CARE PHYSICIAN AT ONCE OR RETURN IMMEDIATELY TO THE EMERGENCY DEPARTMENT. If you have been prescribed any medication(s), please fill your prescription right away and begin taking the medication(s) as directed.   If you believe that any of the medications

## (undated) NOTE — LETTER
Name:  Lesia De Luna Year:  9th Grade Class: Student ID No.:   Address:  [address] [place] [address],[address] Floor [address] [place] [address] Phone:  [phone] (home)  : 89/ 13year old   Name Relationship [address] Work Phone Home Phone Mobile Phone   1.  MEHRDAD syndrome, short QT syndrome, Brugada syndrome, or catecholaminergic polymorphic ventricular tachycardia? No   15. Does anyone in your family have a heart problem, pacemaker, or implanted defibrillator? No   16.  Has anyone in your family had unexplained salvador 39. Do you have a history of seizure disorder? No   37. Do you have headaches with exercise? No   38. Have you ever had numbness, tingling, or weakness in your arms or legs after being hit or falling? No   39. Have you ever been unable to move your arms / l MEDICAL NORMAL ABNORMAL FINDINGS   Appearance:  Marfan stigmata (kyphoscoliosis, high-arched palate, pectus excavatum,      arachnodactyly, arm span > height, hyperlaxity, myopia, MVP, aortic insufficiency) Yes    Eyes/Ears/Nose/Throat:    · Pupils equal that I/our student will not use performance-enhancing substances as defined in the [facility] Performance-Enhancing Substance Testing Program Protocol.  We have reviewed the policy and understand that I/our student may be asked to submit to testing for the presen

## (undated) NOTE — LETTER
10/18/2017          To Whom It May Concern:    Lynda Jones is currently under my medical care and should not play soccer until 10/25 due to injury. If you require additional information please contact our office. Sincerely,    Rica Adkins

## (undated) NOTE — ED AVS SNAPSHOT
Kassi Manzano   MRN: [id number]    Department:  [facility] Emergency Department   Date of Visit:  8/26/2018           Disclosure     Insurance plans vary and the physician(s) referred by the ER may not be covered by your plan.  Please contact CARE PHYSICIAN AT ONCE OR RETURN IMMEDIATELY TO THE EMERGENCY DEPARTMENT. If you have been prescribed any medication(s), please fill your prescription right away and begin taking the medication(s) as directed.   If you believe that any of the medications

## (undated) NOTE — LETTER
Date & Time: 8/27/2018, 12:53 AM  Patient: Lee Henao  Encounter Provider(s):    Lelia Gill MD       To Whom It May Concern:    Jonah Austin was seen and treated in our department on 8/26/2018.  He should not return to school until Tuesday or

## (undated) NOTE — MR AVS SNAPSHOT
[facility]  [address], [address] [place] [address]  [phone]               Thank you for choosing us for your health care visit with Nurse.   We are glad to serve you and happy to provide you with this summary of your visit An initiative of the American Academy of Pediatrics    Fact Sheet: Healthy Active Living for Families    Healthy nutrition starts as early as infancy with breastfeeding.  Once your baby begins eating solid foods, introduce nutritious foods early on and ofte

## (undated) NOTE — LETTER
[facility] [employer] Child Health Examination       Student's Name  Marshall Rodriguez verifying above immunization history must sign below.   Signature                                                                                                                                   Title                           Date     Signature [facility] Birth Date  11/26/2005  Sex  Male School   Grade Level/ID#  10th Grade   HEALTH HISTORY          TO BE COMPLETED AND SIGNED BY PARENT/GUARDIAN AND VERIFIED BY HEALTH CARE PROVIDER    ALLERGIES  (Food, drug, insect, other)  Patient has no Bone/Joint problem/injury/scoliosis?    Yes   No  Parent/Guardian Signature                                          Date     PHYSICAL EXAMINATION REQUIREMENTS    Entire section below to be completed by MD/DO/APN/PA       PHYSICAL EXAMINATION REQUIREMENTS ( Ears Yes                      Screen result: Gastrointestinal Yes    Eyes Yes     Screen result:   Genito-Urinary Yes  LMP   Nose Yes  Neurological Yes    Throat Yes  Musculoskeletal Yes    Mouth/Dental Yes  Spinal examination Yes    Cardiovascular/HTN Yes Rev 11/15                                                                    Printed by the [employer]